# Patient Record
Sex: MALE | URBAN - METROPOLITAN AREA
[De-identification: names, ages, dates, MRNs, and addresses within clinical notes are randomized per-mention and may not be internally consistent; named-entity substitution may affect disease eponyms.]

---

## 2019-08-10 ENCOUNTER — APPOINTMENT (RX ONLY)
Dept: URBAN - METROPOLITAN AREA CLINIC 151 | Facility: CLINIC | Age: 69
Setting detail: DERMATOLOGY
End: 2019-08-10

## 2019-08-10 DIAGNOSIS — L81.4 OTHER MELANIN HYPERPIGMENTATION: ICD-10-CM

## 2019-08-10 DIAGNOSIS — L65.9 NONSCARRING HAIR LOSS, UNSPECIFIED: ICD-10-CM

## 2019-08-10 PROBLEM — E13.9 OTHER SPECIFIED DIABETES MELLITUS WITHOUT COMPLICATIONS: Status: ACTIVE | Noted: 2019-08-10

## 2019-08-10 PROBLEM — I48.91 UNSPECIFIED ATRIAL FIBRILLATION: Status: ACTIVE | Noted: 2019-08-10

## 2019-08-10 PROCEDURE — ? DIAGNOSIS COMMENT

## 2019-08-10 PROCEDURE — ? COUNSELING

## 2019-08-10 PROCEDURE — 99203 OFFICE O/P NEW LOW 30 MIN: CPT

## 2019-08-10 ASSESSMENT — LOCATION ZONE DERM: LOCATION ZONE: ARM

## 2019-08-10 ASSESSMENT — LOCATION DETAILED DESCRIPTION DERM: LOCATION DETAILED: RIGHT PROXIMAL RADIAL DORSAL FOREARM

## 2019-08-10 ASSESSMENT — LOCATION SIMPLE DESCRIPTION DERM: LOCATION SIMPLE: RIGHT FOREARM

## 2019-08-10 NOTE — PROCEDURE: MIPS QUALITY
Quality 431: Preventive Care And Screening: Unhealthy Alcohol Use - Screening: Patient screened for unhealthy alcohol use using a single question and scores less than 2 times per year
Quality 131: Pain Assessment And Follow-Up: Pain assessment using a standardized tool is documented as negative, no follow-up plan required
Quality 130: Documentation Of Current Medications In The Medical Record: Current Medications Documented
Quality 226: Preventive Care And Screening: Tobacco Use: Screening And Cessation Intervention: Patient screened for tobacco use and is an ex/non-smoker
Quality 111:Pneumonia Vaccination Status For Older Adults: Pneumococcal Vaccination Previously Received
Detail Level: Detailed

## 2019-08-10 NOTE — PROCEDURE: DIAGNOSIS COMMENT
Detail Level: Simple
Comment: ROS does not reveal suggestive of underlying systemic origin for alopecia. TSH and CBC WNL. Less likely is diffuse alopecia areata. 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 Rec’d. consider propecia (discuss with PMD) or topical minoxidil 5%. Photographs taken and will reassess in 2-3 months.

## 2019-08-10 NOTE — PROCEDURE: COUNSELING
Detail Level: Detailed
Patient Specific Counseling (Will Not Stick From Patient To Patient): -Discussed unknown etiology and hair growth cycle\\n-Recommended biotin can help hair health/growth\\n-Advised hair loss gradually increases with age, reassured normal\\n-Discussed oral and topical treatments, finasteride and Rogaine

## 2021-12-13 ENCOUNTER — PREPPED CHART (OUTPATIENT)
Dept: URBAN - METROPOLITAN AREA CLINIC 33 | Facility: CLINIC | Age: 71
End: 2021-12-13

## 2021-12-13 PROBLEM — E11.3553 STABLE PDR: Noted: 2021-12-13

## 2021-12-13 PROBLEM — H35.372 EPIRETINAL MEMBRANE: Noted: 2021-12-13

## 2021-12-13 PROBLEM — H43.811 POSTERIOR VITREOUS DETACHMENT: Noted: 2021-12-13

## 2021-12-13 PROBLEM — H26.492 POSTERIOR CAPSULAR OPACITY: Noted: 2021-12-13

## 2022-05-26 ASSESSMENT — VISUAL ACUITY
OD_CC: 20/30
OS_PH: 20/25-2
OD_PH: 20/25-2
OS_CC: 20/30

## 2022-05-26 ASSESSMENT — TONOMETRY
OD_IOP_MMHG: 19
OS_IOP_MMHG: 14

## 2022-06-20 ENCOUNTER — FOLLOW UP (OUTPATIENT)
Dept: URBAN - METROPOLITAN AREA CLINIC 33 | Facility: CLINIC | Age: 72
End: 2022-06-20

## 2022-06-20 DIAGNOSIS — E11.3553: ICD-10-CM

## 2022-06-20 DIAGNOSIS — E11.3593: ICD-10-CM

## 2022-06-20 DIAGNOSIS — H35.372: ICD-10-CM

## 2022-06-20 PROCEDURE — 92134 CPTRZ OPH DX IMG PST SGM RTA: CPT

## 2022-06-20 PROCEDURE — 92014 COMPRE OPH EXAM EST PT 1/>: CPT

## 2022-06-20 PROCEDURE — 92202 OPSCPY EXTND ON/MAC DRAW: CPT

## 2022-06-20 ASSESSMENT — VISUAL ACUITY
OD_CC: 20/25-2
OS_CC: 20/25-2

## 2022-06-20 ASSESSMENT — TONOMETRY
OD_IOP_MMHG: 18
OS_IOP_MMHG: 17

## 2023-03-06 ENCOUNTER — FOLLOW UP (OUTPATIENT)
Dept: URBAN - METROPOLITAN AREA CLINIC 33 | Facility: CLINIC | Age: 73
End: 2023-03-06

## 2023-03-06 DIAGNOSIS — H35.372: ICD-10-CM

## 2023-03-06 DIAGNOSIS — E11.3553: ICD-10-CM

## 2023-03-06 PROCEDURE — 92202 OPSCPY EXTND ON/MAC DRAW: CPT

## 2023-03-06 PROCEDURE — 92014 COMPRE OPH EXAM EST PT 1/>: CPT

## 2023-03-06 PROCEDURE — 92250 FUNDUS PHOTOGRAPHY W/I&R: CPT | Mod: NC

## 2023-03-06 PROCEDURE — 92134 CPTRZ OPH DX IMG PST SGM RTA: CPT

## 2023-03-06 ASSESSMENT — VISUAL ACUITY
OS_CC: 20/40+2
OD_CC: 20/25-2

## 2023-03-06 ASSESSMENT — TONOMETRY
OS_IOP_MMHG: 13
OD_IOP_MMHG: 16

## 2023-09-11 ENCOUNTER — FOLLOW UP (OUTPATIENT)
Dept: URBAN - METROPOLITAN AREA CLINIC 33 | Facility: CLINIC | Age: 73
End: 2023-09-11

## 2023-09-11 DIAGNOSIS — E11.3553: ICD-10-CM

## 2023-09-11 DIAGNOSIS — H35.372: ICD-10-CM

## 2023-09-11 DIAGNOSIS — H43.811: ICD-10-CM

## 2023-09-11 PROCEDURE — 92014 COMPRE OPH EXAM EST PT 1/>: CPT

## 2023-09-11 PROCEDURE — 92202 OPSCPY EXTND ON/MAC DRAW: CPT

## 2023-09-11 PROCEDURE — 92134 CPTRZ OPH DX IMG PST SGM RTA: CPT

## 2023-09-11 ASSESSMENT — VISUAL ACUITY
OS_CC: 20/30
OD_CC: 20/25+2

## 2024-03-11 ENCOUNTER — FOLLOW UP (OUTPATIENT)
Dept: URBAN - METROPOLITAN AREA CLINIC 33 | Facility: CLINIC | Age: 74
End: 2024-03-11

## 2024-03-11 DIAGNOSIS — H43.811: ICD-10-CM

## 2024-03-11 DIAGNOSIS — H35.372: ICD-10-CM

## 2024-03-11 DIAGNOSIS — E11.3553: ICD-10-CM

## 2024-03-11 PROCEDURE — 92202 OPSCPY EXTND ON/MAC DRAW: CPT

## 2024-03-11 PROCEDURE — 92014 COMPRE OPH EXAM EST PT 1/>: CPT

## 2024-03-11 PROCEDURE — 92134 CPTRZ OPH DX IMG PST SGM RTA: CPT

## 2024-03-11 ASSESSMENT — VISUAL ACUITY
OD_CC: 20/25-3
OS_CC: 20/30-1
OS_PH: 20/25-2

## 2024-03-11 ASSESSMENT — TONOMETRY
OD_IOP_MMHG: 15
OS_IOP_MMHG: 12

## 2024-09-09 ENCOUNTER — FOLLOW UP (OUTPATIENT)
Dept: URBAN - METROPOLITAN AREA CLINIC 33 | Facility: CLINIC | Age: 74
End: 2024-09-09

## 2024-09-09 DIAGNOSIS — H35.372: ICD-10-CM

## 2024-09-09 DIAGNOSIS — E11.3553: ICD-10-CM

## 2024-09-09 DIAGNOSIS — H43.811: ICD-10-CM

## 2024-09-09 PROCEDURE — 92202 OPSCPY EXTND ON/MAC DRAW: CPT

## 2024-09-09 PROCEDURE — 92014 COMPRE OPH EXAM EST PT 1/>: CPT

## 2024-09-09 PROCEDURE — 92134 CPTRZ OPH DX IMG PST SGM RTA: CPT

## 2024-09-09 ASSESSMENT — TONOMETRY
OD_IOP_MMHG: 20
OS_IOP_MMHG: 19

## 2024-09-09 ASSESSMENT — VISUAL ACUITY
OS_PH: 20/30
OD_CC: 20/30-1
OS_CC: 20/50-2